# Patient Record
Sex: FEMALE | Race: WHITE | NOT HISPANIC OR LATINO | Employment: OTHER | URBAN - NONMETROPOLITAN AREA
[De-identification: names, ages, dates, MRNs, and addresses within clinical notes are randomized per-mention and may not be internally consistent; named-entity substitution may affect disease eponyms.]

---

## 2017-09-28 ENCOUNTER — OFFICE VISIT (OUTPATIENT)
Dept: ORTHOPEDIC SURGERY | Facility: CLINIC | Age: 62
End: 2017-09-28

## 2017-09-28 DIAGNOSIS — S72.002A CLOSED LEFT HIP FRACTURE, INITIAL ENCOUNTER (HCC): Primary | ICD-10-CM

## 2017-09-28 PROCEDURE — 99024 POSTOP FOLLOW-UP VISIT: CPT | Performed by: ORTHOPAEDIC SURGERY

## 2017-09-28 PROCEDURE — 73502 X-RAY EXAM HIP UNI 2-3 VIEWS: CPT | Performed by: ORTHOPAEDIC SURGERY

## 2017-09-28 RX ORDER — RIVAROXABAN 10 MG/1
TABLET, FILM COATED ORAL
COMMUNITY
Start: 2017-09-20

## 2017-09-28 RX ORDER — OXYCODONE HYDROCHLORIDE AND ACETAMINOPHEN 5; 325 MG/1; MG/1
1 TABLET ORAL EVERY 8 HOURS PRN
Qty: 40 TABLET | Refills: 0 | Status: SHIPPED | OUTPATIENT
Start: 2017-09-28 | End: 2017-09-28 | Stop reason: SDUPTHER

## 2017-09-28 RX ORDER — MELOXICAM 15 MG/1
TABLET ORAL
COMMUNITY
Start: 2017-07-24

## 2017-09-28 RX ORDER — OXYCODONE HYDROCHLORIDE AND ACETAMINOPHEN 5; 325 MG/1; MG/1
1 TABLET ORAL SEE ADMIN INSTRUCTIONS
Qty: 45 TABLET | Refills: 0 | Status: SHIPPED | OUTPATIENT
Start: 2017-09-28 | End: 2017-10-10 | Stop reason: CLARIF

## 2017-09-28 RX ORDER — OXYCODONE HYDROCHLORIDE AND ACETAMINOPHEN 5; 325 MG/1; MG/1
TABLET ORAL
COMMUNITY
Start: 2017-09-20 | End: 2017-09-28 | Stop reason: SDUPTHER

## 2017-09-28 RX ORDER — CITALOPRAM 10 MG/1
TABLET ORAL
COMMUNITY
Start: 2017-09-20

## 2017-10-06 PROBLEM — S72.002A CLOSED LEFT HIP FRACTURE, INITIAL ENCOUNTER (HCC): Status: ACTIVE | Noted: 2017-10-06

## 2017-10-09 NOTE — TELEPHONE ENCOUNTER
Please have a prescription ready for the patient for Percocet 5/325 mg tab 1 by mouth Q8 to 12 when necessary pain total 40 pills and no refills.  I can sign this prescription tomorrow, Tuesday morning in the office and she can have it.  Tablet in the afternoon.

## 2017-10-09 NOTE — TELEPHONE ENCOUNTER
Patient called requesting a refill on Percocet 5/325mg she states she's trying to only take 2 pills every 12 hours so she only has enough for about 4 more days but she wanted to go ahead and call in.

## 2017-10-10 RX ORDER — OXYCODONE HYDROCHLORIDE AND ACETAMINOPHEN 5; 325 MG/1; MG/1
TABLET ORAL
Qty: 40 TABLET | Refills: 0 | Status: SHIPPED | OUTPATIENT
Start: 2017-10-10

## 2017-10-10 NOTE — TELEPHONE ENCOUNTER
Patient has been informed that her Rx is ready to be picked up in our [place] office. She has voiced understanding and states her  will stop by before Friday to pick this script up.

## 2017-10-11 ENCOUNTER — TELEPHONE (OUTPATIENT)
Dept: ORTHOPEDIC SURGERY | Facility: CLINIC | Age: 62
End: 2017-10-11

## 2017-10-11 RX ORDER — DOXYCYCLINE HYCLATE 50 MG/1
100 CAPSULE ORAL DAILY
Qty: 14 CAPSULE | Refills: 0 | Status: SHIPPED | OUTPATIENT
Start: 2017-10-11

## 2017-10-11 NOTE — TELEPHONE ENCOUNTER
Patient came into the office to  rx.  She had concerns of drainage coming from the lower incision site.  She has a pin hole that does have some drainage. She has some soreness around the area and also fullness which could be swelling. The rest of the incision is healed and fully closed.  Per Dr. Horn call in Doxycycline for precaution and dress the area daily.  Patient returns to the office on the 26th.  Patient's rx sent Erx and patient has been advised.

## 2017-10-26 ENCOUNTER — OFFICE VISIT (OUTPATIENT)
Dept: ORTHOPEDIC SURGERY | Facility: CLINIC | Age: 62
End: 2017-10-26

## 2017-10-26 DIAGNOSIS — S72.002A CLOSED LEFT HIP FRACTURE, INITIAL ENCOUNTER (HCC): Primary | ICD-10-CM

## 2017-10-26 PROCEDURE — 73502 X-RAY EXAM HIP UNI 2-3 VIEWS: CPT | Performed by: ORTHOPAEDIC SURGERY

## 2017-10-26 PROCEDURE — 99024 POSTOP FOLLOW-UP VISIT: CPT | Performed by: ORTHOPAEDIC SURGERY

## 2017-10-26 RX ORDER — OXYCODONE HYDROCHLORIDE AND ACETAMINOPHEN 5; 325 MG/1; MG/1
1 TABLET ORAL NIGHTLY PRN
Qty: 40 TABLET | Refills: 0 | Status: SHIPPED | OUTPATIENT
Start: 2017-10-26

## 2017-10-27 DIAGNOSIS — M81.0 OSTEOPOROSIS, UNSPECIFIED OSTEOPOROSIS TYPE, UNSPECIFIED PATHOLOGICAL FRACTURE PRESENCE: Primary | ICD-10-CM

## 2017-11-05 NOTE — PROGRESS NOTES
No chief complaint on file.  Chief complaint: Fall with comminuted left proximal a fracture status post ORIF: Follow-up      HPI patient is 6-1/2 weeks out from an ORIF of a very complex comminuted proximal femur fracture.  She was treated with a long CM nail.  She is doing a little bit better than before.  Her mobility is improved right a bit.  She is still on a strict nonweightbearing protocol for that lower extremity.  I have discussed with her about getting a DEXA scan in the near future because of the fragility of the proximal femur.  If she does have osteoporosis and we would like to treat that with anti-bone resorptive agents.  Overall she states that after a long time she is starting to feel a little better since the bone is starting to unite and her mobility has improved.        There were no vitals filed for this visit.        Joint/Body Part Specific Exam:  left hip. The patient is postop status post orif of a hip fracture 6.5 week(s). Incisions are clean. Calf is soft and nontender. Mary's sign is negative. Patient has been appropriately anticoagulated. There is no limb length discrepancy. No hardware related problems are noted. Greater trochanter is somewhat tender. IT band is painful and tender for the patient. Hip flexion is 0-60°, hip abduction is 0-40°. Dorsalis pedis and posterior tibial artery pulses are palpable. Common peroneal nerve function is well preserved.       X-RAY REPORT:  left Hip X-Ray  Indication: Evaluation of implant position after ORIF of a complex proximal femur fracture  AP and lateral  Findings: Extensive comminution is noted, the position of the fracture fragments is acceptable and some callus formation is noted.  no bony lesion  Soft tissues within normal limits  within normal limits joint spaces  Hardware appropriately positioned yes      yes prior studies available for comparison.    X-RAY was ordered and reviewed by Darrell Horn MD          Diagnoses and all orders for  this visit:    Closed left hip fracture, initial encounter            Procedures        Instructions:      Plan: Incision care.    Strict nonweightbearing for 2 more weeks on the left lower extremity.    Use a walker for assistance with ambulation.    Tablet ibuprofen 600 mg orally twice a day for pain and discomfort.    Tablet aspirin 325 mg tab 1 by mouth daily for DVT prophylaxis.    Falls precautions.    Calcium and vitamin D for bone health.    Tablet Percocet 5/325 mg tab 1 by mouth daily at bedtime for pain and discomfort 40 pills no refills.    Follow-up in 6 weeks for reevaluation and repeat x-rays.    Controlled substance treatment options discussed in detail. Patient's signed consent to medical options on file. JUSTIN form in chart. Risks of narcotic medication usage outlined.  Possibility of physical and psychological dependence and abuse, especially long term, emphasized to the patient.  I have explained to the patient that we will try to wean them off the narcotic medication as soon as possible and introduce non-narcotic modalities of pain control.  I have also discussed the possibility of random drug testing as well as pill counts to prevent misuse and misappropriation of the narcotic medications prescribed to the patient.

## 2017-12-07 ENCOUNTER — OFFICE VISIT (OUTPATIENT)
Dept: ORTHOPEDIC SURGERY | Facility: CLINIC | Age: 62
End: 2017-12-07

## 2017-12-07 DIAGNOSIS — S72.002D CLOSED FRACTURE OF LEFT HIP WITH ROUTINE HEALING, SUBSEQUENT ENCOUNTER: Primary | ICD-10-CM

## 2017-12-07 PROCEDURE — 99213 OFFICE O/P EST LOW 20 MIN: CPT | Performed by: ORTHOPAEDIC SURGERY

## 2017-12-07 PROCEDURE — 73502 X-RAY EXAM HIP UNI 2-3 VIEWS: CPT | Performed by: ORTHOPAEDIC SURGERY

## 2017-12-07 RX ORDER — ESCITALOPRAM OXALATE 20 MG/1
TABLET ORAL
COMMUNITY
Start: 2017-11-13 | End: 2020-03-19 | Stop reason: SDUPTHER

## 2017-12-07 RX ORDER — CITALOPRAM 20 MG/1
TABLET ORAL
COMMUNITY
Start: 2017-11-14

## 2017-12-07 RX ORDER — SULFAMETHOXAZOLE AND TRIMETHOPRIM 800; 160 MG/1; MG/1
TABLET ORAL
COMMUNITY
Start: 2017-10-28

## 2017-12-07 NOTE — PROGRESS NOTES
"Chief Complaint   Patient presents with   • Left Hip - Follow-up   Patient is here today following up on her left hip.      HPI patient is doing quite well at this point following a comminuted proximal femur fracture that was treated with ORIF.  Date of injury was 9/11/2017.  She is making good progress in terms of healing of the fracture.  Her pain and swelling are subsiding consistently.  She feels that the fracture starting to heal and she feels a lot better than she did initially after the surgical intervention.  She is starting to put some more weight on this lower extremity as the fracture consolidates.  He has talked to her primary care physician and they have placed her on some Lexapro which is helping her \"with the trauma and the fact that she is not able to go back to work which definitely makes her feel depressed.  She states that she has lost 20 pounds because of her lack of appetite and overall that is actually helping her to maintain her mobility and make her feel better overall.  They have started her on PrOlia to help improve her bone mineral density.  She does feel like there is about one half of an inch of shortening on the left lower extremity because of settling at the fracture site.  I have explained to her that this is usually a very normal occurrence in comminuted proximal femur fractures and at this point the only recourse really is to give her a shoe insert.  She states that she would like to do that so that her gait patterns can be Joe stopped and she has less stress and pressure on her lumbar spine. 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        There were no vitals filed for this visit.        Joint/Body Part Specific Exam:  left hip. The patient is postop status post orif of a hip fracture 13 week(s). Incisions are clean. Calf is soft and nontender. " Mary's sign is negative. Patient has been appropriately anticoagulated. There is no limb length discrepancy. No hardware related problems are noted. Greater trochanter is somewhat tender. IT band is painful and tender for the patient. Hip flexion is 0-70, hip abduction is 0-30. Dorsalis pedis and posterior tibial artery pulses are palpable. Common peroneal nerve function is well preserved.       X-RAY REPORT:  left Hip X-Ray  Indication: Evaluation of healing of the fracture of a very significantly comminuted proximal femur fracture  AP and lateral  Findings: Acceptable position of the fracture fragments with callus formation connecting the major fragments.  There is a distinct improvement compared to the x-rays from last visit.  no bony lesion  Soft tissues within normal limits  within normal limits joint spaces  Hardware appropriately positioned yes      yes prior studies available for comparison.    X-RAY was ordered and reviewed by MD Stephany Catess was seen today for follow-up.    Diagnoses and all orders for this visit:    Closed fracture of left hip with routine healing, subsequent encounter  -     XR Hip With or Without Pelvis 2 - 3 View Left            Procedures        Instructions:      Plan:Weightbearing as tolerated.    Begin to wean off the crutches.    1/2 inch shoe inserts on the operative side to combat the shortening resulting from settling at the fracture site.    I would strongly recommend that she bring in her DEXA scan reports so that we can review it and discuss if she requires antibiotics resorptive treatment.    Tablet ibuprofen 600 mg orally twice a day for pain and discomfort.    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    Continue with aggressive physical therapy of the hip including strengthening of the flexors and the abductors of the hip.    Falls  precaution.    Calcium and vitamin D for bone health.    Follow-up in my office in 2 months for reevaluation and repeat x-rays.

## 2017-12-15 PROBLEM — S72.002A CLOSED FRACTURE OF LEFT HIP (HCC): Status: ACTIVE | Noted: 2017-12-15

## 2018-02-15 ENCOUNTER — OFFICE VISIT (OUTPATIENT)
Dept: ORTHOPEDIC SURGERY | Facility: CLINIC | Age: 63
End: 2018-02-15

## 2018-02-15 DIAGNOSIS — S72.002D CLOSED FRACTURE OF LEFT HIP WITH ROUTINE HEALING, SUBSEQUENT ENCOUNTER: ICD-10-CM

## 2018-02-15 DIAGNOSIS — Z87.81 S/P ORIF (OPEN REDUCTION INTERNAL FIXATION) FRACTURE: Primary | ICD-10-CM

## 2018-02-15 DIAGNOSIS — Z98.890 S/P ORIF (OPEN REDUCTION INTERNAL FIXATION) FRACTURE: Primary | ICD-10-CM

## 2018-02-15 PROCEDURE — 99214 OFFICE O/P EST MOD 30 MIN: CPT | Performed by: ORTHOPAEDIC SURGERY

## 2018-02-15 PROCEDURE — 73502 X-RAY EXAM HIP UNI 2-3 VIEWS: CPT | Performed by: ORTHOPAEDIC SURGERY

## 2018-02-15 RX ORDER — TRAMADOL HYDROCHLORIDE 50 MG/1
50 TABLET ORAL EVERY 12 HOURS PRN
Qty: 40 TABLET | Refills: 0 | OUTPATIENT
Start: 2018-02-15

## 2018-02-15 NOTE — PROGRESS NOTES
Chief Complaint   Patient presents with   • Left Hip - Follow-up   Patient is here today following up on her left hip.        HPI the patient had sustained a complex comminuted proximal femoral fracture in September 2017.  She underwent open reduction internal fixation on 11 September 2017.  A long CM nail was used at that point.  She has done reasonably well postoperatively.  She is quite independently mobile at this point.  She is not using a cane or walker for assistance with ambulation.  Unfortunately, she is not yet ready to go back to work because she has issues with balance and pain and discomfort.  She does have significant amount of osteoporosis which is affecting both her hip and her lumbar spine.  I have discussed the reports of her DEXA scan today with her.  She does have about half an inch of limb length discrepancy with shortening over the fracture site.  This is being addressed with a heel raise.  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          Review of Systems   Constitutional: Negative.    HENT: Negative.    Eyes: Negative.    Respiratory: Negative.    Cardiovascular: Negative.    Gastrointestinal: Negative.    Endocrine: Negative.    Genitourinary: Negative.    Musculoskeletal: Positive for back pain, gait problem and joint swelling.   Skin: Negative.    Allergic/Immunologic: Negative.    Hematological: Negative.    Psychiatric/Behavioral: Negative.            Physical Exam   Constitutional: She is oriented to person, place, and time. She appears well-nourished.   HENT:   Head: Atraumatic.   Eyes: EOM are normal.   Neck: Neck supple.   Cardiovascular: Normal rate, regular rhythm and normal heart sounds.    Pulmonary/Chest: Effort normal and breath sounds normal.   Abdominal: Soft. Bowel sounds are normal.   Musculoskeletal: She exhibits edema and tenderness.   Neurological: She is alert and oriented to person, place, and time. She has  normal reflexes.   Skin: Skin is dry.   Psychiatric: She has a normal mood and affect. Her behavior is normal. Judgment and thought content normal.   Nursing note and vitals reviewed.          Joint/Body Part Specific Exam:  left hip. The patient is postop status post orif of a hip fracture 5 month(s). Incisions are clean. Calf is soft and nontender. Mary's sign is negative. Patient has been appropriately anticoagulated. There is no limb length discrepancy. No hardware related problems are noted. Greater trochanter is somewhat tender. IT band is painful and tender for the patient. Hip flexion is 0-90°Hip abduction is0-30°. Dorsalis pedis and posterior tibial artery pulses are palpable. Common peroneal nerve function is well preserved.     right SI JOINT: Mild tenderness is present dorsally over the SI joint. Figure of 4 sign is positive. There is mild spasm present in the erector spinae muscle. Flexion and extension are associated with discomfort. Deep tendon reflexes are intact and symmetrical on both lower extremities. Pain radiates into the buttock on extension of the hip. No evidence of cauda equina syndrome. No motor or sensory deficit is noted. There is no bowel or bladder involvement.  X-RAY Report:  left Hip X-Ray  Indication:Evaluation of healing of the intertrochanteric fracture left hip.    AP and lateral views  Findings:  acceptable fracture healing with callus formation  no bony lesion  Soft tissues within normal limits  within normal limits joint spaces  Hardware appropriately positioned yes      yes prior studies available for comparison.    X-RAY was ordered and reviewed by Darrell Horn MD          Diagnostics:        Danae was seen today for follow-up.    Diagnoses and all orders for this visit:    S/P ORIF (open reduction internal fixation) fracture  -     XR Hip With or Without Pelvis 2 - 3 View Left    Closed fracture of left hip with routine healing, subsequent encounter    Other orders  -      Diclofenac Sodium (PENNSAID) 2 % solution; Apply two pumps to the affected area twice a day prn pain  -     traMADol (ULTRAM) 50 MG tablet; Take 1 tablet by mouth Every 12 (Twelve) Hours As Needed for Moderate Pain .          Procedures        Plan:  Weightbearing as tolerated with stretching and strengthening exercises of the hip and the knee musculature.    Tablet Ultram 50 mg tab 1 by mouth daily at bedtime for pain swelling and discomfort.    Tablet diclofenac 75 mg tab 1 by mouth twice a day for breakthrough pain.    Continue with daily working out at [facility] in town to redevelop the musculature of the left lower extremity.    Nonoperative care for the right sacroiliac joint dysfunction at this point.  I have discussed with the patient about the possibility of steroid injection to the SI joint to manage her symptoms.    The patient is on Prolia injections for her osteoporosis and I certainly agree with that line of management.    Calcium and vitamin D for bone health.    Falls precaution.    1/2 inch heel lift and given to the patient to overcome the limb length discrepancy that she has sustained because of the fracture.    There is no indication for removal of any of the implants today in the office.    Topical application of Pennsaid anti-inflammatory salve to help with anti-inflammatory effects around the greater trochanteric bursitis and the right SI joint.    Patient will need a repeat DEXA scan in 1 year for reevaluation of the osteopenia and to see the effect of the medication on this condition.    Follow-up in my office in 3 months for reevaluation.    Patient is on long-term disability and is unlikely to return back to work any time soon.  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  Graft I  spent in the office today is 30 minutes going over all these different treatment options and reviewing her DEXA scan results.  Greater than 50% of my time was spent on discussing with the patient about bone health possibility of implant removal and return to work.    Controlled substance treatment options discussed in detail. Patient's signed consent to medical options on file. JUSTIN form in chart.  The patient is being provided this narcotic prescription to address the acute medical condition that they are undergoing/experiencing at this time.  It is my medical and surgical assessment that more than 3 days of narcotic medication is necessary to help control the pain and discomfort that this patient is experiencing at this point.  Risks of narcotic medication usage outlined.  Possibility of physical and psychological dependence and abuse, especially long term, emphasized to the patient.  I have explained to the patient that we will try to wean them off the narcotic medication as soon as possible and introduce non-narcotic modalities of pain control.  At this point in the patient's clinical spectrum, however, alternative available treatments are inadequate to control their pain and symptoms.  I have also discussed the possibility of random drug testing as well as pill counts to prevent misuse and misappropriation of the narcotic medications prescribed to the patient.

## 2018-02-27 PROBLEM — Z87.81 S/P ORIF (OPEN REDUCTION INTERNAL FIXATION) FRACTURE: Status: ACTIVE | Noted: 2018-02-27

## 2018-02-27 PROBLEM — Z98.890 S/P ORIF (OPEN REDUCTION INTERNAL FIXATION) FRACTURE: Status: ACTIVE | Noted: 2018-02-27

## 2018-03-09 ENCOUNTER — DOCUMENTATION (OUTPATIENT)
Dept: ORTHOPEDIC SURGERY | Facility: CLINIC | Age: 63
End: 2018-03-09

## 2018-05-24 ENCOUNTER — OFFICE VISIT (OUTPATIENT)
Dept: ORTHOPEDIC SURGERY | Facility: CLINIC | Age: 63
End: 2018-05-24

## 2018-05-24 DIAGNOSIS — S72.002D CLOSED FRACTURE OF LEFT HIP WITH ROUTINE HEALING, SUBSEQUENT ENCOUNTER: Primary | ICD-10-CM

## 2018-05-24 DIAGNOSIS — R52 PAIN: ICD-10-CM

## 2018-05-24 DIAGNOSIS — Z98.890 S/P ORIF (OPEN REDUCTION INTERNAL FIXATION) FRACTURE: ICD-10-CM

## 2018-05-24 DIAGNOSIS — Z87.81 S/P ORIF (OPEN REDUCTION INTERNAL FIXATION) FRACTURE: ICD-10-CM

## 2018-05-24 PROCEDURE — 99213 OFFICE O/P EST LOW 20 MIN: CPT | Performed by: ORTHOPAEDIC SURGERY

## 2018-05-24 PROCEDURE — 72220 X-RAY EXAM SACRUM TAILBONE: CPT | Performed by: ORTHOPAEDIC SURGERY

## 2018-05-24 PROCEDURE — 73502 X-RAY EXAM HIP UNI 2-3 VIEWS: CPT | Performed by: ORTHOPAEDIC SURGERY

## 2018-05-24 NOTE — PROGRESS NOTES
Chief Complaint   Patient presents with   • Left Hip - Follow-up   • Follow-up     LEFT HIP FRACTURE   Patient is here today following up on her left hip.        HPI patient fell and sustained a very displaced, comminuted fracture of the left hip which extended from the intertrochanteric region to the subtrochanteric region.  Date of injury was September 10, 2017.  She has undergone an interlocked, intramedullary nailing of this fracture.  She is doing quite well in terms of healing.  She does have three quarters of an inch of shortening on this side which she addresses with a shoe raise.  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  She still has a sense of instability and feels that she can fall if she does not have a cane for assistance with ambulation.  The patient has been complaining of coccygeal pain for about 2 months.  She denies any history of trauma to this part of the body.  The pain is deep seated over the coccyx.  It is worse with prolonged seating.  There is no neurological deficit report by the patient.        There were no vitals filed for this visit.        Review of Systems   Constitutional: Negative.    HENT: Negative.    Eyes: Negative.    Respiratory: Negative.    Cardiovascular: Negative.    Gastrointestinal: Negative.    Endocrine: Negative.    Genitourinary: Negative.    Musculoskeletal: Positive for gait problem and joint swelling.   Skin: Negative.    Allergic/Immunologic: Negative.    Hematological: Negative.    Psychiatric/Behavioral: Negative.            Physical Exam   Constitutional: She is oriented to person, place, and time. She appears well-nourished.   HENT:   Head: Atraumatic.   Eyes: EOM are normal.   Neck: Neck supple.   Cardiovascular: Regular rhythm, normal heart sounds and intact distal pulses.    Pulmonary/Chest: Effort normal.   Abdominal: Bowel sounds are  normal.   Musculoskeletal: She exhibits edema and tenderness.   Neurological: She is alert and oriented to person, place, and time.   Skin: Skin is dry. Capillary refill takes 2 to 3 seconds.   Psychiatric: She has a normal mood and affect. Her behavior is normal. Judgment and thought content normal.   Nursing note and vitals reviewed.          Joint/Body Part Specific Exam:  left hip. The patient is postop status post orif of a hip fracture 8 month(s). Incisions are clean. Calf is soft and nontender. Mary's sign is negative. Patient has been appropriately anticoagulated. There is no limb length discrepancy. No hardware related problems are noted. Greater trochanter is somewhat tender. IT band is painful and tender for the patient. Hip flexion is 0-90°, hip abduction is 0-30°. Dorsalis pedis and posterior tibial artery pulses are palpable. Common peroneal nerve function is well preserved.     Sacrum/coccyx: There is no clinical deformity.  She does have some direct tenderness over the tip of the coccyx.  There is no evidence of a car the equina syndrome and  there is no neurological deficit.  She does not have any direct tenderness over the sacral body.  There is no deformity of the tip of the coccyx.  X-RAY Report:  left Hip X-Ray  Indication: Evaluation of healing of a comminuted left hip intertrochanteric/subtrochanteric fracture  AP and lateral  Findings: Anatomically acceptable position of the fracture fragments with callus formation noted  no bony lesion  Soft tissues within normal limits  within normal limits joint spaces  Hardware appropriately positioned yes      yes prior studies available for comparison.    X-RAY was ordered and reviewed by Darrell Horn MD    Sacrum/coccyx X-Ray  Indication: Coccygeal pain worse on sitting  AP and Frogleg views  Findings: Fairly normal contour of the coccyx without any fractures noted  no bony lesion  Soft tissues within normal limits  within normal limits joint  spaces  Hardware appropriately positioned not applicable      no prior studies available for comparison.    X-RAY was ordered and reviewed by Darrell Horn MD          Diagnostics:        Danae was seen today for follow-up and follow-up.    Diagnoses and all orders for this visit:    Closed fracture of left hip with routine healing, subsequent encounter  -     XR Hip With or Without Pelvis 2 - 3 View Left    Pain  -     XR Sacrum & Coccyx    S/P ORIF (open reduction internal fixation) fracture            Procedures        Plan:  Weightbearing as tolerated.    Stretching and strengthening exercises of the hip musculature.    She has been recommended to use a half inch shoe raise on the left side.    Use a cane for assistance with ambulation.    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    She is currently on a short-term disability.    Follow-up in my office in 3 months for reevaluation.    No indication for removal of any of the implants at this point.    Use an air cushion to support the coccyx and to prevent direct pressure over this region to minimize pain and discomfort.    Use a Sitz bath with Epsom salts to help minimize the pain and inflammation around the coccygeal region.    Tablet ibuprofen 600 mg orally twice a day for pain swelling and discomfort.

## 2018-08-30 ENCOUNTER — OFFICE VISIT (OUTPATIENT)
Dept: ORTHOPEDIC SURGERY | Facility: CLINIC | Age: 63
End: 2018-08-30

## 2018-08-30 DIAGNOSIS — M54.16 CHRONIC RADICULAR LUMBAR PAIN: ICD-10-CM

## 2018-08-30 DIAGNOSIS — Z87.81 S/P ORIF (OPEN REDUCTION INTERNAL FIXATION) FRACTURE: ICD-10-CM

## 2018-08-30 DIAGNOSIS — Z87.81 S/P LEFT HIP FRACTURE: Primary | ICD-10-CM

## 2018-08-30 DIAGNOSIS — Z98.890 S/P ORIF (OPEN REDUCTION INTERNAL FIXATION) FRACTURE: ICD-10-CM

## 2018-08-30 DIAGNOSIS — G89.29 CHRONIC RADICULAR LUMBAR PAIN: ICD-10-CM

## 2018-08-30 DIAGNOSIS — S72.002D CLOSED FRACTURE OF LEFT HIP WITH ROUTINE HEALING, SUBSEQUENT ENCOUNTER: ICD-10-CM

## 2018-08-30 PROCEDURE — 73502 X-RAY EXAM HIP UNI 2-3 VIEWS: CPT | Performed by: ORTHOPAEDIC SURGERY

## 2018-08-30 PROCEDURE — 99213 OFFICE O/P EST LOW 20 MIN: CPT | Performed by: ORTHOPAEDIC SURGERY

## 2018-09-08 PROBLEM — G89.29 CHRONIC RADICULAR LUMBAR PAIN: Status: ACTIVE | Noted: 2018-09-08

## 2018-09-08 PROBLEM — M54.16 CHRONIC RADICULAR LUMBAR PAIN: Status: ACTIVE | Noted: 2018-09-08

## 2019-03-21 ENCOUNTER — OFFICE VISIT (OUTPATIENT)
Dept: ORTHOPEDIC SURGERY | Facility: CLINIC | Age: 64
End: 2019-03-21

## 2019-03-21 VITALS — TEMPERATURE: 98.4 F | HEIGHT: 67 IN | WEIGHT: 157 LBS | BODY MASS INDEX: 24.64 KG/M2

## 2019-03-21 DIAGNOSIS — M54.16 CHRONIC RADICULAR LUMBAR PAIN: ICD-10-CM

## 2019-03-21 DIAGNOSIS — G89.29 CHRONIC RADICULAR LUMBAR PAIN: ICD-10-CM

## 2019-03-21 DIAGNOSIS — Z87.81 S/P ORIF (OPEN REDUCTION INTERNAL FIXATION) FRACTURE: Primary | ICD-10-CM

## 2019-03-21 DIAGNOSIS — Z98.890 S/P ORIF (OPEN REDUCTION INTERNAL FIXATION) FRACTURE: Primary | ICD-10-CM

## 2019-03-21 PROCEDURE — 99213 OFFICE O/P EST LOW 20 MIN: CPT | Performed by: ORTHOPAEDIC SURGERY

## 2019-03-21 PROCEDURE — 73502 X-RAY EXAM HIP UNI 2-3 VIEWS: CPT | Performed by: ORTHOPAEDIC SURGERY

## 2019-03-21 NOTE — PROGRESS NOTES
NEW/FOLLOW UP VISIT    Danae Fitch:  ?  1955:  ?  Chief Complaint   Patient presents with   • Left Hip - Follow-up      ?  HPI: The patient is following up on a very complex comminuted subtrochanteric fracture of the left hip.  She has now been 1.5 years postop following a complex fixation of the fracture.  She is doing a lot better than before.  She is on Fosamax for bone mineral density control.  She states that she has a lot of stiffness in the hip and finds it difficult to climb out of the recliner when she has been seated for a prolonged period of time.  She does not have any hardware-related problems.  She does report that she has about three quarters of an inch of shortening of the lower extremity and she uses a shoe lift to help manage her limb length discrepancy.  She does have some issues with chronic low back pain and works with rehabilitation and physical therapy to help manage her symptoms.  Overall she is very pleased with the outcome following the healing of the hip fracture.      This patient is an established patient.  This problem is not new to this examiner.    Allergies: No Known Allergies    Medications:   Home Medications:  Current Outpatient Medications on File Prior to Visit   Medication Sig   • citalopram (CeleXA) 10 MG tablet    • citalopram (CeleXA) 20 MG tablet    • doxycycline (VIBRAMYCIN) 50 MG capsule Take 2 capsules by mouth Daily.   • escitalopram (LEXAPRO) 20 MG tablet    • meloxicam (MOBIC) 15 MG tablet    • oxyCODONE-acetaminophen (PERCOCET) 5-325 MG per tablet Take one tablet by mouth every 8-12 hours prn pain   • oxyCODONE-acetaminophen (PERCOCET) 5-325 MG per tablet Take 1 tablet by mouth At Night As Needed for Severe Pain .   • PROLIA 60 MG/ML solution syringe    • sulfamethoxazole-trimethoprim (BACTRIM DS,SEPTRA DS) 800-160 MG per tablet    • traMADol (ULTRAM) 50 MG tablet Take 1 tablet by mouth Every 12 (Twelve) Hours As Needed for Moderate Pain .   • XARELTO 10 MG  "tablet      No current facility-administered medications on file prior to visit.      Current Medications:  Scheduled Meds:  PRN Meds:.    I have reviewed the patient's medical history in detail and updated the computerized patient record.  Review and summarization of old records include:    History reviewed. No pertinent past medical history.  No past surgical history on file.  Social History     Occupational History   • Not on file   Tobacco Use   • Smoking status: Former Smoker   Substance and Sexual Activity   • Alcohol use: Yes     Drinks per session: 1 or 2   • Drug use: Not on file   • Sexual activity: Not on file      Social History     Social History Narrative   • Not on file     History reviewed. No pertinent family history.    Review of Systems   Constitutional: Negative.    HENT: Negative.    Eyes: Negative.    Respiratory: Negative.    Cardiovascular: Negative.    Gastrointestinal: Negative.    Endocrine: Negative.    Genitourinary: Negative.    Musculoskeletal: Positive for back pain and joint swelling.   Skin: Negative.    Allergic/Immunologic: Negative.    Neurological: Negative.    Hematological: Negative.    Psychiatric/Behavioral: Negative.           Wt Readings from Last 3 Encounters:   03/21/19 71.2 kg (157 lb)     Ht Readings from Last 3 Encounters:   03/21/19 168.9 cm (66.5\")     Body mass index is 24.96 kg/m².  Facility age limit for growth percentiles is 20 years.  Vitals:    03/21/19 1131   Temp: 98.4 °F (36.9 °C)         Physical Exam   Constitutional: Patient is oriented to person, place, and time. Appears well-developed and well-nourished.   HENT:   Head: Normocephalic and atraumatic.   Eyes: Conjunctivae and EOM are normal. Pupils are equal, round, and reactive to light.   Cardiovascular: Normal rate, regular rhythm, normal heart sounds and intact distal pulses.   Pulmonary/Chest: Effort normal and breath sounds normal.   Musculoskeletal:   See detailed exam below   Neurological: Alert " and oriented to person, place, and time. No sensory deficit. Coordination normal.   Skin: Skin is warm and dry. Capillary refill takes less than 2 seconds. No rash noted. No erythema.   Psychiatric: Patient has a normal mood and affect. Her behavior is normal. Judgment and thought content normal.   Nursing note and vitals reviewed.    Ortho Exam:     Left hip. The patient is postop status post orif of a hip fracture 1.5 year(s). Incisions are clean. Calf is soft and nontender. Mary's sign is negative. Patient has been appropriately anticoagulated. There is no limb length discrepancy. No hardware related problems are noted. Greater trochanter is somewhat tender. IT band is painful and tender for the patient. Hip flexion is 0-80 degrees, hip abduction is 0-30 degrees. Dorsalis pedis and posterior tibial artery pulses are palpable. Common peroneal nerve function is well preserved.     Lumbar Spine: The overlying skin and soft tissues are mildly swollen. Deep tendon reflexes are bilaterally, symmetric and equal.  No long tract signs are noted. There is No evidence of myelopathy. No bowel or bladder deficit noted. Mild spasm of erector spinae muscle is noted. left sided rotation is associated with pain and discomfort. Straight leg raise test is positive to 60 degrees. Contralateral straight leg raise is negative. Pain radiates to buttock and thigh. Get up and go is slow due to the lumbar pain.No objective motor or sensory function loss is noted.   Diagnostics:left Hip X-Ray  Indication: Healing of a proximal femoral subtrochanteric fracture  AP and lateral  Findings: Anatomically acceptable fracture fragment position with callus formation noted  no bony lesion  Soft tissues within normal limits  within normal limits joint spaces  Hardware appropriately positioned yes      yes prior studies available for comparison.    X-RAY was ordered and reviewed by Darrell Horn MD           Assessment:  Danae was seen today for  follow-up.    Diagnoses and all orders for this visit:    S/P ORIF (open reduction internal fixation) fracture  -     XR Hip With or Without Pelvis 2 - 3 View Left    Chronic radicular lumbar pain    Other orders  -     Diclofenac Sodium (PENNSAID) 2 % solution; Apply two pumps to the affected area twice a day prn pain          Procedures  ?  ?  Plan  Okay to use a shoe raise on the right side.    Calcium and vitamin D for bone health.    Fosamax 70 mg tab 1 p.o. weekly for bone mineral density purposes.    She needs to get regular dental checkup to make sure she does not get avascular necrosis of the jaw or loosening of the teeth.  · Compression/brace  · Rest, ice, compression, and elevation (RICE) therapy  · Stretching and strengthening exercises  · OTC Ibuprofen 600mg by mouth every 6-8 hours as needed for pain and swelling  · Follow up in 1 year(s)      Darrell Horn MD  3/31/2019

## 2019-09-26 ENCOUNTER — CONVERSION ENCOUNTER (OUTPATIENT)
Dept: CARDIOLOGY | Facility: CLINIC | Age: 64
End: 2019-09-26
Attending: SPECIALIST

## 2020-01-20 ENCOUNTER — TELEPHONE (OUTPATIENT)
Dept: ORTHOPEDIC SURGERY | Facility: CLINIC | Age: 65
End: 2020-01-20

## 2020-01-20 NOTE — TELEPHONE ENCOUNTER
PATIENT CALLED AND STATES THAT SHE RECENTLY FELL ON THE SURGICAL HIP AND DOESN'T HAVE A FOLLOW UP UNTIL MARCH. SHE WANTED TO KNOW IF SHE NEEDED TO COME IN SOONER. I ADVISED THAT SHE NEEDED TO OBTAIN X-RAYS AT EITHER EMERGENCY ROOM OR PCP AND WE COULD GO FROM THERE. SHE NEEDED AN ORDER FOR PCP SO I FAXED FOR HER/RJ

## 2020-01-20 NOTE — TELEPHONE ENCOUNTER
That is the correct advice.  Let us see what the x-rays of her hip show and then we can make a call on when we can see her next.

## 2020-03-19 ENCOUNTER — OFFICE VISIT (OUTPATIENT)
Dept: ORTHOPEDIC SURGERY | Facility: CLINIC | Age: 65
End: 2020-03-19

## 2020-03-19 VITALS — HEIGHT: 68 IN | TEMPERATURE: 98.2 F | WEIGHT: 146 LBS | BODY MASS INDEX: 22.13 KG/M2

## 2020-03-19 DIAGNOSIS — Z98.890 S/P ORIF (OPEN REDUCTION INTERNAL FIXATION) FRACTURE: ICD-10-CM

## 2020-03-19 DIAGNOSIS — S72.002D CLOSED FRACTURE OF LEFT HIP WITH ROUTINE HEALING, SUBSEQUENT ENCOUNTER: Primary | ICD-10-CM

## 2020-03-19 DIAGNOSIS — Z87.81 S/P ORIF (OPEN REDUCTION INTERNAL FIXATION) FRACTURE: ICD-10-CM

## 2020-03-19 PROCEDURE — 99213 OFFICE O/P EST LOW 20 MIN: CPT | Performed by: ORTHOPAEDIC SURGERY

## 2020-03-19 RX ORDER — FLUOXETINE HYDROCHLORIDE 40 MG/1
CAPSULE ORAL
COMMUNITY
Start: 2020-02-26

## 2020-03-19 RX ORDER — CYCLOSPORINE 0.5 MG/ML
EMULSION OPHTHALMIC
COMMUNITY

## 2020-03-19 RX ORDER — ALENDRONATE SODIUM 70 MG/1
TABLET ORAL
COMMUNITY

## 2020-03-19 RX ORDER — ESCITALOPRAM OXALATE 20 MG/1
TABLET ORAL
COMMUNITY

## 2021-03-17 DIAGNOSIS — Z87.81 S/P ORIF (OPEN REDUCTION INTERNAL FIXATION) FRACTURE: Primary | ICD-10-CM

## 2021-03-17 DIAGNOSIS — Z98.890 S/P ORIF (OPEN REDUCTION INTERNAL FIXATION) FRACTURE: Primary | ICD-10-CM

## 2021-03-18 ENCOUNTER — OFFICE VISIT (OUTPATIENT)
Dept: ORTHOPEDIC SURGERY | Facility: CLINIC | Age: 66
End: 2021-03-18

## 2021-03-18 ENCOUNTER — HOSPITAL ENCOUNTER (OUTPATIENT)
Dept: OTHER | Facility: HOSPITAL | Age: 66
Discharge: HOME | End: 2021-03-18
Attending: ORTHOPAEDIC SURGERY

## 2021-03-18 VITALS — WEIGHT: 146 LBS | BODY MASS INDEX: 22.13 KG/M2 | HEIGHT: 68 IN | TEMPERATURE: 96.7 F

## 2021-03-18 DIAGNOSIS — Z98.890 S/P ORIF (OPEN REDUCTION INTERNAL FIXATION) FRACTURE: Primary | ICD-10-CM

## 2021-03-18 DIAGNOSIS — Z87.81 S/P ORIF (OPEN REDUCTION INTERNAL FIXATION) FRACTURE: Primary | ICD-10-CM

## 2021-03-18 DIAGNOSIS — S72.002D CLOSED FRACTURE OF LEFT HIP WITH ROUTINE HEALING, SUBSEQUENT ENCOUNTER: ICD-10-CM

## 2021-03-18 PROCEDURE — 99213 OFFICE O/P EST LOW 20 MIN: CPT | Performed by: ORTHOPAEDIC SURGERY

## 2021-03-26 NOTE — PROGRESS NOTES
"Chief Complaint  Follow-up and Pain of the Left Hip    Subjective    History of Present Illness      Danae Fitch is a 65 y.o. female who presents to [facility] for follow-up on left proximal femur fracture status post ORIF 3-1/2 years ago.  She is also having some early right hip pain and discomfort.  History of Present Illness this patient is well-known to me and she underwent a ORIF of a left hip fracture performed by me on 10 September 2017.  She has a long intramedullary lindsey with a static locking mode in place.  She has done reasonably well from this left hip fracture.  She does have slight shortening with limb length discrepancy but she makes it up with gait modifications as well as shoe raise.  She does not have any pain or discomfort at this point.  She did have some prominence of a lateral locking screw at the base of the greater trochanter but that does not bother her at this point.  She states that the right hip has now started to bother her to some degree.  She has a slight limp on the right side.  There is some pain in the groin most likely consistent with early arthritis of the hip joint.  Pain Location: BILATERAL hip  Radiation: none  Quality: dull, aching  Intensity/Severity: moderate  Duration: 3 years  Progression of symptoms: no worsening, reports improvement  Onset quality: sudden  Timing: intermittent  Aggravating Factors: rising after sitting, squatting  Alleviating Factors: Tylenol  Previous Episodes: yes  Associated Symptoms: pain, swelling  ADLs Affected: ambulating  Previous Treatment: NSAIDs       Objective   Vital Signs:   Temp 96.7 °F (35.9 °C)   Ht 172.7 cm (67.99\")   Wt 66.2 kg (146 lb)   BMI 22.20 kg/m²     Physical Exam  Physical Exam  Vitals signs and nursing note reviewed.   Constitutional:       Appearance: Normal appearance.   Pulmonary:      Effort: Pulmonary effort is normal.   Skin:     General: Skin is warm and dry.      Capillary Refill: " Capillary refill takes less than 2 seconds.   Neurological:      General: No focal deficit present.      Mental Status: He is alert and oriented to person, place, and time. Mental status is at baseline.   Psychiatric:         Mood and Affect: Mood normal.         Behavior: Behavior normal.         Thought Content: Thought content normal.         Judgment: Judgment normal.     Ortho Exam   Right hip (djd): Neurovascular status is intact. Patient does not have a limp on the affected side. Internal and external rotations are not associated with pain and discomfort. Anterior joint line pain and tenderness is significant. Stinchfield sign is positive. Figure of 4 sign is positive. Patient is unable to perform an active straight leg raise exam. Greater trochanter is tender. Crossover adduction test is positive. Cross body adduction is limited and painful for the patient. Patient has very significant limp and joint line tenderness anteriorly, posteriorly and medially. Dorsalis pedis and posterior tibial artery pulses are palpable. Common peroneal nerve function is well preserved.         Left hip-ORIF. The patient is postop status post orif of a hip fracture 3.5 year(s). Incisions are clean. Calf is soft and nontender. Mary's sign is negative. Patient has been appropriately anticoagulated. There is no limb length discrepancy. No hardware related problems are noted. Greater trochanter is somewhat tender. IT band is painful and tender for the patient. Hip flexion is 0 to 90 degrees, hip abduction is 0 to 30 degrees. Dorsalis pedis and posterior tibial artery pulses are palpable. Common peroneal nerve function is well preserved.         Result Review :   The following data was reviewed by: Darrell Horn MD on 03/18/2021:                Procedures           Assessment   Assessment and Plan    Diagnoses and all orders for this visit:    1. S/P ORIF (open reduction internal fixation) fracture (Primary)    2. Closed fracture of  left hip with routine healing, subsequent encounter          Follow Up   · Use a cane for assistance with ambulation.  · There is no indication to remove any of the screws or the locking lindsey from the left side femoral fracture.  · Arthritis in the right hip is very early and at this point there is no indication for hip replacement surgery.  · Calcium and vitamin D for bone health.  · Glucosamine, chondroitin and turmeric for cartilage health.  · Rest, ice, compression, and elevation (RICE) therapy  · Stretching and strengthening exercises of the hip flexors and abductors.  · OTC Tylenol 500-1000mg by mouth every 6 hours as needed for pain   · Follow up in 1 year(s)  • Patient was given instructions and counseling regarding her condition or for health maintenance advice. Please see specific information pulled into the AVS if appropriate.     Darrell Horn MD   Date of Encounter: 3/18/2021   Electronically signed by Darrell Horn MD, 03/26/21, 3:10 PM EDT.     EMR Dragon/Transcription disclaimer:  Much of this encounter note is an electronic transcription/translation of spoken language to printed text. The electronic translation of spoken language may permit erroneous, or at times, nonsensical words or phrases to be inadvertently transcribed; Although I have reviewed the note for such errors, some may still exist.

## 2021-05-11 NOTE — PROCEDURES
Procedure Note      Patient Name: Danae Fitch   Patient ID: [id number]   Sex: Female   YOB: 1955        Visit Date: September 26, 2019    Provider: Donnell Sepulveda MD   Location: [facility]   Location Address: [address] [place] [address]  [id number]   Location Phone: [phone]          FINAL REPORT   TRANSTHORACIC ECHOCARDIOGRAM REPORT    Diagnosis: Palpitations   Height: 5'8 Weight: 145 B/P: BLOOD PRESSURE BSA: 1.79   Tech: W   MEASUREMENTS:  RVID (Diastole) : RVID. (NORMAL: 0.7 to 2.4 cm max)   LVID (Systole): 2.7 cm (Diastole): 4.6 cm . (NORMAL: 3.7 - 5.4 cm)   Posterior Wall Thickness (Diastole): 0.9 cm. (NORMAL: 0.8 - 1.1 cm)   Septal Thickness (Diastole): 0.8 cm. (NORMAL: 0.7 - 1.2 cm)   LAID (Systole): 3.1 cm. (NORMAL: 1.9 - 3.8 cm)   Aortic Root Diameter (Diastole): 2.3 cm. (NORMAL: 2.0 - 3.7 cm)   DOPPLER:  E/A ratio 1.1 (NORMAL 0.8-2.0)   DT: 202 msec (NORMAL 140-240 msec.)   IVRT 117 m/sec (NORMAL  m/sec.)   E/E': 12 (NORMAL <8 avg.)   COMMENTS:  The patient underwent 2-D, M-Mode, and Doppler examination, including pulse-wave, continuous-wave, and color-flow analysis; the study is technically adequate. The following was observed:   FINDINGS:  AORTIC VALVE: Normal. Tricuspid in appearance with normal central closure.   MITRAL VALVE: mitral valve prolapse.   TRICUSPID VALVE: Normal.   PULMONIC VALVE: Not well visualized.   LEFT ATRIUM: Normal. No intracavitary masses or clots seen. LA volume index is 23 mL/m2.   AORTIC ROOT: Normal in size with adequate motion.   LEFT VENTRICLE: Normal left ventricular systolic function. Ejection fraction 69%.   RIGHT ATRIUM: Normal.   RIGHT VENTRICLE: Normal size and function.   PERICARDIUM: Unremarkable. No evidence of effusion.   INFERIOR VENA CAVA: Diameter is 1.7 cm.   DOPPLER: Doppler examination of the aortic, mitral, tricuspid, and pulmonary valves was performed. Normal pulmonary artery systolic pressure  by Doppler. Shows mild mitral regurgitation and mild tricuspid regurgitation.      CONCLUSION:    1. Mitral valve prolapse.  2. Normal left ventricle systolic function.  3. Mild mitral regurgitation.  4. Mild tricuspid regurgitation.      MD ROSANNE Medrano/wt    Cc: Kacie Taylor                 Electronically Signed by: Fani Butler-, -Author on September 30, 2019 08:05:00 AM  Electronically Co-signed by: Donnell Sepulveda MD -Reviewer on October 4, 2019 01:27:57 PM